# Patient Record
Sex: MALE
[De-identification: names, ages, dates, MRNs, and addresses within clinical notes are randomized per-mention and may not be internally consistent; named-entity substitution may affect disease eponyms.]

---

## 2018-10-29 ENCOUNTER — HOSPITAL ENCOUNTER (EMERGENCY)
Dept: HOSPITAL 88 - ER | Age: 58
Discharge: HOME | End: 2018-10-29
Payer: COMMERCIAL

## 2018-10-29 VITALS — WEIGHT: 199 LBS | BODY MASS INDEX: 27.86 KG/M2 | HEIGHT: 71 IN

## 2018-10-29 DIAGNOSIS — S20.212A: Primary | ICD-10-CM

## 2018-10-29 DIAGNOSIS — E78.5: ICD-10-CM
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
## 2018-10-29 DIAGNOSIS — E07.9: ICD-10-CM

## 2018-10-29 DIAGNOSIS — E11.9: ICD-10-CM
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
## 2018-10-29 DIAGNOSIS — Z86.73: ICD-10-CM

## 2018-10-29 DIAGNOSIS — I10: ICD-10-CM

## 2018-10-29 PROCEDURE — 71101 X-RAY EXAM UNILAT RIBS/CHEST: CPT

## 2018-10-29 PROCEDURE — 99283 EMERGENCY DEPT VISIT LOW MDM: CPT

## 2018-10-29 NOTE — XMS REPORT
Continuity of Care Document

                             Created on: 12/10/2018



ROBERT GAMA

External Reference #: [id number]

: 1960

Sex: Male



Demographics







                          Address                   [address] [place]  [address]

 

                          Home Phone                [phone]

 

                          Preferred Language        Unknown

 

                          Marital Status            Unknown

 

                          Voodoo Affiliation     Unknown

 

                          Race                      Unknown

 

                          Ethnic Group              Unknown





Author







                          Author                    [facility]              Interface

 

                          Address                   Unknown

 

                          Phone                     Unavailable



                                                    



Problems

                    





                    Problem                            Status                            Onset Date     

                          Classification                            Date Reported       

                          Comments                            Source                    

 

                    E04.2                            Active                            2018       

                                                                                       

                                        [facility]                    

 

                          E03.9 - HYPOTHYROIDISM, UNSPECIFIED                            Active             

                    2018                                                              

                                                       [facility]                  

  



                                                                                
                       



Medications

                    





                    Medication                            Details                            Route      

                          Status                            Patient Instructions         

                          Ordering Provider                            Order Date           

                                        Source                    



                                                                        



Allergies, Adverse Reactions, Alerts

                    





                    Substance                            Category                            Reaction   

                          Severity                            Reaction type           

                          Status                            Date Reported                     

                          Comments                            Source                    



                                                                



Immunizations

                    





                    Immunization                            Date Given                            Site  

                          Status                            Last Updated             

                          Comments                            Source                    



                                                                        



Results

                    





                    Order Name                            Results                            Value      

                          Reference Range                            Date                

                          Interpretation                            Comments                       

                                        Source                    

 

                          Thyroid scan - Multiple uptakes NM                            Thyroid scan - Multiple

 uptakes NM                             Patient Name: ROBERT GAMA

: 1960; Age: 58 years  y/o Male

MR: [id number]



Study: Thyroid scan - Multiple uptakes NM 9/10/2018 9:00 AM CDT

Ordering Physician: Karthik Joshi MD

Comparison: Thyroid biopsy 5/10/2018



Clinical Indication:  - Nontoxic multinodular goiter;    



The patient was administered 230 uCi I-123 orally with 6 hour uptake 1.2%. 24 
hour uptake is 0.8%. Scintiphotos obtained in the anterior, Togolese and MATTA position
demonstrate focal patchy uptake at the inferior pole of the right thyroid lobe 
and minimal stippled uptake at the mid left thyroid lobe.



IMPRESSION:

                                        1. Severe diminished thyroid uptake at 6 and 24 hours with limited uptake at the

 thyroid gland. Differential considerations would include exogenous or excess 
iodine uptake, subacute thyroiditis or Hashimoto's disease.







SL: [id number]



                                                          09/10/2018                 

                                                      -

                                        -





Read by:  Soham Romero MD

Dictated Date/time:  18 09:28

Electronically Signed by:  Soham Romero MD              18 
09:55

FINAL REPORT

                                        [facility]                    

 

                          Thyroid biopsy w guidance US                            Thyroid biopsy w guidance 

US                                      Patient Name: ROBERT GAMA

: 1960; Age: 57 years  y/o Male

MR: [id number]



Study: Thyroid biopsy w guidance US 5/10/2018 1:09 PM CDT

Ordering Physician: Davonte Schafer MD



Clinical Indication: Lobulated hypoechoic nodule in the mid to inferior pole of 
the left thyroid lobe measuring 3.3 x 1.5 x 1.3 seen containing a 7 mm 
hyperechoic focus similar to report from an outside examination although images 
are not available for review at this time.



Comparison: None



CONSENT:

The risks and benefits of ultrasound guided thyroid biopsy including alternative
forms of treatment and treatment were discussed with the patient. The patient 
indicated understanding, was allowed to ask questions, and signed a witnessed 
informed consent. : None needed.





PROCEDURE:



The patient was placed in the supine position with hyperextension of the neck as
tolerated. Suitable access to the biopsy site was marked under ultrasound 
guidance. The area was prepared and draped in the usual sterile fashion. Local 
anesthesia of the skin was performed with administration of approximately 3 mL 
1% lidocaine. Under continuous ultrasound guidance, 2 attempts at fine needle 
aspiration of the left thyroid nodule were made. The patient experienced 8/10 
pain and coughing toward the end of the first attempt. He immediately 
experienced pain and coughing while entering for the second attempt. Biopsy was 
aborted. Samples were obtained during each pass and submitted to cytopathology 
for examination including prepared slides and washings.



The patient's pain and decreased after application of an icepack to 
approximately 4/10. He also noted a left-sided headache. Post biopsy ultrasound 
performed after applying pressure for approximately 5 minutes demonstrated no 
evidence of significant hematoma, fluid collection, or vascular injury. The 
patient was instructed regarding postprocedural care and expectations. The 
patient was instructed to contact me at this facility, contact the requesting 
physician, or report to the nearest emergency department if any questions or 
concerns arise.





IMPRESSION:



Status post left thyroid nodule fine-needle aspiration as above discussed. The 
examination had to be aborted secondary to pain and coughing. The scant sample 
from the first pass was sent to the laboratory. No sample could be obtained on 
the second pass as the patient could not tolerate further FNA. The patient was 
instructed to call me at this compartment or his physician with any questions or
concerns. He was also instructed to report to the emergency department should 
symptoms worsen or new symptoms developed. The biopsy may be reattempted under 
light sedation or anesthesia if indicated.





SL:  [id number]



                                                          05/10/2018                 

                                                      -

                                        -





Read by:  Doni Escobar MD

Dictated Date/time:  05/10/18 14:46

Electronically Signed by:  Doni Escobar MD                   05/10/18 
14:52

FINAL REPORT

                                         [facility]                    



                                                                                
                               



Vital Signs

                    





                    Vital Sign                            Value                            Date         

                          Comments                            Source                    



                                                                        



Encounters

                    





                    Location                            Location Details                            Encounter

 Type                            Encounter Number                            Reason For

 Visit                            Attending Provider                            ADM Date

                            DC Date                            Status                

                                        Source                    

 

                          [facility]                                                 

                          Outpt Diag Services                            [phone]                   

                                                Davonte Schafer                             05/10/2018

                            2018                                               

                                         [facility]                    

 

                                                                            Outpatient                  

                    [id number]                                                        [facility]

                             2018                                              

                          Active                            [facility]                    

 

                                                                            Outpatient                  

                    [id number]                                                        ECHO [facility]IT

                             12/10/2018                                              

                          Active                            [facility]                    

 

                                                                            Outpatient                  

                    [id number]                                                        [facility]

                             [facility]                                              

                          Active                            [facility]                    



                                                                                
                                       



Procedures

                    





                    Procedure                            Code                            Date           

                          Perfomer                            Comments                        

                                        Source

## 2018-10-29 NOTE — XMS REPORT
Summary of Care: 5/10/18 - 5/10/18

                             Created on: 2057



ROBERT GAMA

External Reference #: [id number]

: 1960

Sex: Male



Demographics







                          Address                   [address] [place]  [address]-

 

                          Home Phone                [phone]

 

                          Preferred Language        English

 

                          Marital Status            

 

                          Mosque Affiliation     Unknown

 

                          Race                      Other

 

                                        Additional Race(s)  

 

                          Ethnic Group              Non-





Author







                          Author                    [facility]

 

                          Address                   Unknown

 

                          Phone                     Unavailable







Encounter





HQ Encntr_alias(FIN) [id number] Date(s): 5/10/18 - 5/10/18

[facility] [address] [place], [place] [address]- [phone]

Discharge Disposition: Home or Self Care

Attending Physician: Davonte Schafer MD





Vital Signs





No data available for this section



Problem List





No data available for this section



Allergies, Adverse Reactions, Alerts





No data available for this section



Medications





No data available for this section



Results





No data available for this section



Immunizations





No data available for this section



Procedures





No data available for this section



Social History





No data available for this section



Assessment and Plan





No data available for this section

## 2018-10-29 NOTE — DIAGNOSTIC IMAGING REPORT
Left rib multiple views



CPT code: 48979



History: Fell between rocks one week ago, left side pain



Comparison: Chest x-ray 3/22/2018.



Findings: 



The rib structures on the left appear intact. There is no evidence of acute

displaced rib fracture or dislocation identified.  



Chest PA single view demonstrates no evidence of pleural effusion or

pneumothorax. Visualized lung fields are clear.



IMPRESSION:



No displaced rib fracture or dislocation. No evidence of pleural or pulmonary

injury.



Thank you for your referral. 



Signed by: Dr. America Bates MD on 10/29/2018 1:40 PM

## 2018-10-29 NOTE — XMS REPORT
Summary of Care: 5/10/18 - 5/10/18

                             Created on: 10/13/2112



ROBERT GAMA

External Reference #: [id number]

: 1960

Sex: Male



Demographics







                          Address                   [address] [place]  [address]

 

                          Home Phone                [phone]

 

                          Preferred Language        English

 

                          Marital Status            

 

                          Mandaen Affiliation     Unknown

 

                          Race                      Other

 

                                        Additional Race(s)  

 

                          Ethnic Group              Non-





Author







                          Author                    [facility]

 

                          Address                   Unknown

 

                          Phone                     Unavailable







Encounter





HQ Encntr_alias(FIN) [id number] Date(s): 5/10/18 - 5/10/18

[facility] [address] [place], [place] [address]- [phone]

Discharge Disposition: Home or Self Care

Attending Physician: Davonte Schafer MD





Vital Signs





No data available for this section



Problem List





No data available for this section



Allergies, Adverse Reactions, Alerts





No data available for this section



Medications





No data available for this section



Results





No data available for this section



Immunizations





No data available for this section



Procedures





No data available for this section



Social History





No data available for this section



Assessment and Plan





No data available for this section